# Patient Record
Sex: MALE | URBAN - METROPOLITAN AREA
[De-identification: names, ages, dates, MRNs, and addresses within clinical notes are randomized per-mention and may not be internally consistent; named-entity substitution may affect disease eponyms.]

---

## 2017-12-12 ENCOUNTER — AMBULATORY - HEALTHEAST (OUTPATIENT)
Dept: NURSING | Facility: CLINIC | Age: 37
End: 2017-12-12

## 2017-12-12 DIAGNOSIS — Z23 NEED FOR IMMUNIZATION AGAINST INFLUENZA: ICD-10-CM

## 2018-04-13 ENCOUNTER — OFFICE VISIT - HEALTHEAST (OUTPATIENT)
Dept: FAMILY MEDICINE | Facility: CLINIC | Age: 38
End: 2018-04-13

## 2018-04-13 DIAGNOSIS — K21.9 GASTROESOPHAGEAL REFLUX DISEASE WITHOUT ESOPHAGITIS: ICD-10-CM

## 2018-04-13 DIAGNOSIS — B37.0 THRUSH: ICD-10-CM

## 2018-04-13 DIAGNOSIS — E66.9 OBESITY (BMI 30-39.9): ICD-10-CM

## 2018-04-13 ASSESSMENT — MIFFLIN-ST. JEOR: SCORE: 1803.04

## 2018-05-23 ENCOUNTER — OFFICE VISIT - HEALTHEAST (OUTPATIENT)
Dept: FAMILY MEDICINE | Facility: CLINIC | Age: 38
End: 2018-05-23

## 2018-05-23 DIAGNOSIS — J30.1 CHRONIC SEASONAL ALLERGIC RHINITIS DUE TO POLLEN: ICD-10-CM

## 2018-05-23 DIAGNOSIS — H10.13 ALLERGIC CONJUNCTIVITIS OF BOTH EYES: ICD-10-CM

## 2018-05-23 ASSESSMENT — MIFFLIN-ST. JEOR: SCORE: 1787.16

## 2018-07-17 ENCOUNTER — COMMUNICATION - HEALTHEAST (OUTPATIENT)
Dept: FAMILY MEDICINE | Facility: CLINIC | Age: 38
End: 2018-07-17

## 2018-07-17 DIAGNOSIS — H35.711 CENTRAL SEROUS CHORIORETINOPATHY OF RIGHT EYE: ICD-10-CM

## 2018-07-18 ENCOUNTER — OFFICE VISIT - HEALTHEAST (OUTPATIENT)
Dept: FAMILY MEDICINE | Facility: CLINIC | Age: 38
End: 2018-07-18

## 2018-07-18 ENCOUNTER — COMMUNICATION - HEALTHEAST (OUTPATIENT)
Dept: SCHEDULING | Facility: CLINIC | Age: 38
End: 2018-07-18

## 2018-07-18 DIAGNOSIS — J30.9 ALLERGIC RHINITIS: ICD-10-CM

## 2018-07-18 DIAGNOSIS — H10.9 CONJUNCTIVITIS: ICD-10-CM

## 2018-07-18 RX ORDER — LORATADINE 10 MG/1
10 TABLET ORAL DAILY
Qty: 30 TABLET | Refills: 6 | Status: SHIPPED | OUTPATIENT
Start: 2018-07-18

## 2018-07-20 ENCOUNTER — RECORDS - HEALTHEAST (OUTPATIENT)
Dept: ADMINISTRATIVE | Facility: OTHER | Age: 38
End: 2018-07-20

## 2019-11-21 ENCOUNTER — OFFICE VISIT - HEALTHEAST (OUTPATIENT)
Dept: FAMILY MEDICINE | Facility: CLINIC | Age: 39
End: 2019-11-21

## 2019-11-21 DIAGNOSIS — Z23 NEED FOR IMMUNIZATION AGAINST INFLUENZA: ICD-10-CM

## 2019-11-21 DIAGNOSIS — L65.9 HAIR LOSS: ICD-10-CM

## 2019-11-21 ASSESSMENT — MIFFLIN-ST. JEOR: SCORE: 1809.84

## 2021-06-01 VITALS — BODY MASS INDEX: 33.67 KG/M2 | HEIGHT: 66 IN | WEIGHT: 209.5 LBS

## 2021-06-01 VITALS — BODY MASS INDEX: 33.57 KG/M2 | WEIGHT: 208 LBS

## 2021-06-01 VITALS — WEIGHT: 206 LBS | BODY MASS INDEX: 33.11 KG/M2 | HEIGHT: 66 IN

## 2021-06-03 NOTE — PROGRESS NOTES
"ASSESSMENT/PLAN:   Momo was seen today for alopecia.    Diagnoses and all orders for this visit:    Hair loss  No signs of systemic illness. Unclear family history of hair loss, but positive in younger brother. Normal exam. Will treat with minoxidil, follow up if no results in 4-6 months.   -     minoxidil (ROGAINE) 2 % external solution; Apply topically 2 (two) times a day.    Need for immunization against influenza  -     Influenza, Recombinant, Inj, Quadrivalent, PF, 18+YRS      Return if symptoms worsen or fail to improve.       =================================================  SUBJECTIVE  Momo Suggs is a 39 y.o. male here for hair loss.     Noticed hair loss in the back of his head first, he saw that at the [employer]. Wants to know if that happens by age or if anything else.     Family history. Doesn't have any information   5 years younger brother losing hair, had some treatment and got his hair back.     No personal history of cancer, thyroid issues. No new shampoos/soaps, medications, place of living. No other symptoms (weight gain/loss, appetite change, fatigue, rashes). Wondering if the brand of his shampoo is an issue. No new shampoo. No change in libido.       ROS  Complete 10 point review of systems negative except as noted above in HPI    Reviewed Past Medical History, Medications, Family History and Social History in Epic and up to date with no new changes.    OBJECTIVE  /88   Pulse 66   Temp 98  F (36.7  C) (Oral)   Resp 16   Ht 5' 6\" (1.676 m)   Wt 211 lb (95.7 kg)   SpO2 97%   BMI 34.06 kg/m       General: Cooperative, pleasant, in no acute distress  HEENT: Thinning hair in posterior central scalp, no rashes, no breakage of hair. Negative hair pull test.   Neck: no lymphadenopathy, no masses  CV: RRR, normal S1/S2, no murmur, rubs, gallops  Neuro: CN II-XII intact  Skin: No rashes on scalp    LABS & IMAGES   None indicated. "     =================================================    Visit was completed along with in person     Options for treatment and follow-up care were reviewed with the patient. Momo Barrosoia and/or guardian was engaged and actively involved in the decision making process. Momo Mcginnismaria and/or guardian verbalized understanding of the options discussed and was satisfied with the final plan.      Mandy Wilson MD

## 2021-06-04 VITALS
SYSTOLIC BLOOD PRESSURE: 140 MMHG | TEMPERATURE: 98 F | OXYGEN SATURATION: 97 % | BODY MASS INDEX: 33.91 KG/M2 | DIASTOLIC BLOOD PRESSURE: 88 MMHG | RESPIRATION RATE: 16 BRPM | HEIGHT: 66 IN | HEART RATE: 66 BPM | WEIGHT: 211 LBS

## 2021-06-16 PROBLEM — E66.9 OBESITY (BMI 30-39.9): Status: ACTIVE | Noted: 2018-04-13

## 2021-06-16 PROBLEM — H35.721 RETINAL PIGMENT EPITHELIAL DETACHMENT OF RIGHT EYE: Status: ACTIVE | Noted: 2017-10-09

## 2021-06-16 PROBLEM — K21.9 GASTROESOPHAGEAL REFLUX DISEASE WITHOUT ESOPHAGITIS: Status: ACTIVE | Noted: 2018-04-13

## 2021-06-17 NOTE — PROGRESS NOTES
"MODESTO Suggs is a 38 y.o. male here for sour taste in mouth. When wakes up in AM, he has this taste. Also has a burning sensation at top of stomach. Not necessarily worse after eating. He works as a  and drinks lots of coffee and eats lots of fast food.      Would like to discuss losing weight. He thinks he has gained 10 lbs in the past few months. During this time, he started a new job as a . He often has to wake up at 3 AM in order to get orders to places like [place] or [place] by 8 AM. He drinks lots of soda and gas station hot chocolate.     Tongue looks white. It gets less white after brushing it- brushes his tongue and teeth at least twice per day- but he is concerned about this.     He recently had a physical with lab work in order to get health insurance. He was told everything looked good. No diabetes. He has the records at home.   Past Medical History:   Diagnosis Date     Obesity      No current outpatient prescriptions on file prior to visit.     No current facility-administered medications on file prior to visit.        Past medical, surgical, family and social history reviewed and updated in medical record.   ?  ROS:   Oropharynx: No sore throat   CV: No chest pain   Resp: No shortness of breath or cough.   GI: No nausea, vomiting, constipation, diarrhea  ?  O  /88  Pulse 72  Temp 98  F (36.7  C) (Oral)   Resp 16  Ht 5' 6\" (1.676 m)  Wt 209 lb 8 oz (95 kg)  BMI 33.81 kg/m2   Vitals reviewed. Nursing note reviewed.  General Appearance: Pleasant and alert, in no acute distress  HEENT: tongue slightly white, oropharynx without edema or exudate, mucous membranes moist, neck supple, no lymphadenopathy  CV: RRR, no murmur, rubs, gallops  Resp: No respiratory distress. Clear to auscultation bilaterally. No wheezes, rales, rhonchi  Abd: Soft, nontender, nondistended, bowel sounds present. No masses.  Ext: No peripheral edema, good distal perfusion  Skin: warm, dry, intact, no " rash noted  Neuro: no focal deficits, CNs II-XII normal.   A/P  Momo was seen today for gastroesophageal reflux and obesity.    Diagnoses and all orders for this visit:    Gastroesophageal reflux disease without esophagitis: will try omeprazole but explained this medication should not be used long-term. Will try for 3 months but encouraged him to try to lose weight. Had a long motivational interviewing discussion about this, discussing ways he can improve his diet and fit some exercise in to his routine. Explained the connection between obesity and GERD and also advised him to avoid foods that trigger reflux (coffee, chocolate, spicy foods), and not lie down for at least 2 hours after eating.   -     omeprazole (PRILOSEC) 20 MG capsule; Take 1 capsule (20 mg total) by mouth daily before breakfast.    Obesity (BMI 30-39.9): he does not have diabetes according to his report, but will try  Metformin for help with weight loss. He is at risk for diabetes and pre-diabetes given his obesity and ethnicity.   -     metFORMIN (GLUCOPHAGE XR) 500 MG 24 hr tablet; Take 1 tablet (500 mg total) by mouth 2 (two) times a day    Thrush: mouth/tongue do not look excessively white but his tongue was slightly white at the back and he may have mild thrush.   -     nystatin (MYCOSTATIN) 100,000 unit/mL suspension; Take 5 mL (500,000 Units total) by mouth 4 (four) times a day for 10 days.    .Visit completed along with assistance of .  Options for treatment and follow-up care were reviewed with the patient and/or guardian. Momo Suggs and/or guardian engaged in the decision making process and verbalized understanding of the options discussed and agreed with the final plan.    Hemalatha Lipscomb MD

## 2021-06-18 NOTE — PROGRESS NOTES
Subjective:   Momo comes in today with an .  He is complaining of eye itching.  He has had a lot of tearing in his eyes.  He does not remember any foreign bodies getting into the eyes.  He does have a history of allergies.  He takes Allegra for that.  He denies any fevers.  He has no rash of any type.  He has had eye symptoms past years as well.  He has had symptoms over the last month.  He is not exposed to any new chemicals of any kind.  Work has not changed at all.  He does get mauricio at work.      Objective:  HEENT: Both TMs are gray.  Sinuses nontender.  Nasal mucosa is boggy and inflamed bilaterally.  Fair airflow noted.  Both conjunctivae are slightly inflamed.  The right side seems worse than the left.  No mattering noted.  No photophobia present.  Lids are not swollen.  Pharynx today is clear.  Neck: Neck reveals no lymphadenopathy.  Lungs: Lungs are totally clear.  Patient's in no respiratory distress.      Assessment:  1.  Allergic rhinitis  2.  Allergic conjunctivitis      Plan:  Patient will stop Allegra and start loratadine 10 mg daily.  Start Patanol 1 drop to both eyes twice daily.  Follow-up here in 1 month if symptoms persist.  If symptoms totally dissipate and stay resolved over 2-3 weeks he can discontinue the medications to see if symptoms recur.

## 2021-06-19 NOTE — PROGRESS NOTES
Assessment/Plan:   Conjunctivitis  Red right eye without ciliary flush or globe pain, no photophobia or headache, mildly decreased visual acuity and what sounds like floaters. Mild tearing and clear to white mucus drainage collecting in medial corner. Does not seem secondarily infected, no purulent drainage. Same appearance and symptoms improved in May with Patanol drops.  He did not realize that there were refills on his original prescription. Is awaiting ophthalmology referral for this and as well previous possible retina problem and floaters. Will have him try the Patanol again pending ophthalmology appointment as there is no definite infection. Refilled his claritin to send as Rx (normal) rather than OTC.   - loratadine (CLARITIN) 10 mg tablet; Take 1 tablet (10 mg total) by mouth daily.  Dispense: 30 tablet; Refill: 6    Cold packs/compresses to the eye to lessen swelling  May resume claritin if needed for eye itching or redness or runny nose from possible allergy  Patanol drops to the right eye as ordered - refills are available  Specialty  should call tomorrow to arrange ophthalmology appointment  Call clinic Friday if no call     Subjective:      Momo Suggs is a 38 y.o. male who presents with his wife for evaluation of right eye redness and drainage.  This has been going on for a week or two.  Two months ago he had a similar issue and was treated with claritin and patanol drops since he had some seasonal allergy sxs as well.  He improved with the drops (did not take the claritin).  He was good for a few weeks and then it has recurred in the right eye and he has no more drops.  He denies injury or foreign body sensation. There is increased watering and mild itching. The lower medial lid has become more tender.  He is wiping white mucus from the medial corner often. He states that a few months ago he lost some vision in the right eye - he states that the vision narrowed and was dark all around  except a [place] in the middle, affecting only the right eye. He went to Urgent Care at [facility] and was told the retina had fallen down and it would get better over time.  He was told he did not need to see an eye specialist.  He reports that the vision has improved but he still sees black dots or squiggles in the right eye - sounds like floaters. No headaches. No URI or cough or sinus. No ear pain or ST. No vertigo or dizziness. No N/V/D. No rash. No fever.  No photophobia or trouble focusing. No risk of foreign bodies at his work, no chemicals or other exposures. NKDA    Current Outpatient Prescriptions on File Prior to Visit   Medication Sig Dispense Refill     olopatadine (PATANOL) 0.1 % ophthalmic solution Instill 1 drop into both eyes twice daily. 5 mL 12     metFORMIN (GLUCOPHAGE XR) 500 MG 24 hr tablet Take 1 tablet (500 mg total) by mouth 2 (two) times a day. 60 tablet 11     omeprazole (PRILOSEC) 20 MG capsule Take 1 capsule (20 mg total) by mouth daily before breakfast. 90 capsule 1     [DISCONTINUED] loratadine (CLARITIN) 10 mg tablet Take 1 tablet (10 mg total) by mouth daily. 30 tablet 6     No current facility-administered medications on file prior to visit.      Patient Active Problem List   Diagnosis     Nasal Passage Blockage (Stuffiness)     Gastroesophageal reflux disease without esophagitis     Obesity (BMI 30-39.9)       Objective:     /80  Pulse 63  Temp 98.2  F (36.8  C) (Oral)   Resp 14  Wt 208 lb (94.3 kg)  SpO2 97%  BMI 33.57 kg/m2    Physical  General Appearance: Alert, cooperative, no distress.  AVSS  Head: Normocephalic, without obvious abnormality, atraumatic  Eyes: Conjunctiva is very reddened on the right side. Normal on the left. Mild tearing. No purulent drainage or crusting now. No ciliary flush, no photophobia or eye pain.  Extraocular movements are intact. PERRLA. Fundi not well visualized. VA is Both 10/10, Left 10/10 and Right 10/20. Redness of the palpebral  surfaces with mild eye lids swelling. Lines under the lower lid suggesting atopy. No stye, no foreign body sensation.   Ears: Normal TMs and external ear canals, both ears  Nose: No significant congestion. No sinus pain with percussion  Throat: Throat is normal.  No exudate.  No significant lesions  Neck: No adenopathy  Lungs: Clear to auscultation bilaterally, respirations unlabored  Heart: Regular rate and rhythm  Skin:  no rashes or lesions  Psychiatric: Patient has a normal mood and affect.